# Patient Record
Sex: FEMALE | Race: WHITE | NOT HISPANIC OR LATINO | URBAN - METROPOLITAN AREA
[De-identification: names, ages, dates, MRNs, and addresses within clinical notes are randomized per-mention and may not be internally consistent; named-entity substitution may affect disease eponyms.]

---

## 2017-09-18 ENCOUNTER — OUTPATIENT (OUTPATIENT)
Dept: OUTPATIENT SERVICES | Facility: HOSPITAL | Age: 82
LOS: 1 days | Discharge: HOME | End: 2017-09-18

## 2017-09-18 DIAGNOSIS — Z12.31 ENCOUNTER FOR SCREENING MAMMOGRAM FOR MALIGNANT NEOPLASM OF BREAST: ICD-10-CM

## 2018-09-25 ENCOUNTER — OUTPATIENT (OUTPATIENT)
Dept: OUTPATIENT SERVICES | Facility: HOSPITAL | Age: 83
LOS: 1 days | Discharge: HOME | End: 2018-09-25

## 2018-09-25 DIAGNOSIS — Z12.31 ENCOUNTER FOR SCREENING MAMMOGRAM FOR MALIGNANT NEOPLASM OF BREAST: ICD-10-CM

## 2018-09-25 DIAGNOSIS — Z85.3 PERSONAL HISTORY OF MALIGNANT NEOPLASM OF BREAST: ICD-10-CM

## 2019-09-27 ENCOUNTER — OUTPATIENT (OUTPATIENT)
Dept: OUTPATIENT SERVICES | Facility: HOSPITAL | Age: 84
LOS: 1 days | Discharge: HOME | End: 2019-09-27
Payer: MEDICARE

## 2019-09-27 DIAGNOSIS — Z12.31 ENCOUNTER FOR SCREENING MAMMOGRAM FOR MALIGNANT NEOPLASM OF BREAST: ICD-10-CM

## 2019-09-27 PROCEDURE — 77067 SCR MAMMO BI INCL CAD: CPT | Mod: 26

## 2019-09-27 PROCEDURE — 77063 BREAST TOMOSYNTHESIS BI: CPT | Mod: 26

## 2020-08-11 ENCOUNTER — TRANSCRIPTION ENCOUNTER (OUTPATIENT)
Age: 85
End: 2020-08-11

## 2020-08-16 ENCOUNTER — TRANSCRIPTION ENCOUNTER (OUTPATIENT)
Age: 85
End: 2020-08-16

## 2020-09-30 ENCOUNTER — OUTPATIENT (OUTPATIENT)
Dept: OUTPATIENT SERVICES | Facility: HOSPITAL | Age: 85
LOS: 1 days | Discharge: HOME | End: 2020-09-30
Payer: MEDICARE

## 2020-09-30 DIAGNOSIS — Z12.31 ENCOUNTER FOR SCREENING MAMMOGRAM FOR MALIGNANT NEOPLASM OF BREAST: ICD-10-CM

## 2020-09-30 PROCEDURE — 77067 SCR MAMMO BI INCL CAD: CPT | Mod: 26

## 2020-09-30 PROCEDURE — 77063 BREAST TOMOSYNTHESIS BI: CPT | Mod: 26

## 2021-12-09 ENCOUNTER — OUTPATIENT (OUTPATIENT)
Dept: OUTPATIENT SERVICES | Facility: HOSPITAL | Age: 86
LOS: 1 days | Discharge: HOME | End: 2021-12-09
Payer: MEDICARE

## 2021-12-09 DIAGNOSIS — Z12.31 ENCOUNTER FOR SCREENING MAMMOGRAM FOR MALIGNANT NEOPLASM OF BREAST: ICD-10-CM

## 2021-12-09 PROCEDURE — 77067 SCR MAMMO BI INCL CAD: CPT | Mod: 26

## 2021-12-09 PROCEDURE — 77063 BREAST TOMOSYNTHESIS BI: CPT | Mod: 26

## 2023-05-01 ENCOUNTER — APPOINTMENT (OUTPATIENT)
Dept: CARDIOLOGY | Facility: CLINIC | Age: 88
End: 2023-05-01
Payer: MEDICARE

## 2023-05-01 VITALS — BODY MASS INDEX: 22.72 KG/M2 | HEIGHT: 65 IN | WEIGHT: 136.38 LBS

## 2023-05-01 DIAGNOSIS — C50.919 MALIGNANT NEOPLASM OF UNSPECIFIED SITE OF UNSPECIFIED FEMALE BREAST: ICD-10-CM

## 2023-05-01 PROCEDURE — 99214 OFFICE O/P EST MOD 30 MIN: CPT

## 2023-05-01 PROCEDURE — 99204 OFFICE O/P NEW MOD 45 MIN: CPT

## 2023-05-01 RX ORDER — LEVOTHYROXINE SODIUM 50 UG/1
50 TABLET ORAL
Qty: 90 | Refills: 0 | Status: ACTIVE | COMMUNITY
Start: 2023-03-24

## 2023-05-01 NOTE — PHYSICAL EXAM
[Normal Venous Pressure] : normal venous pressure [Normal S1, S2] : normal S1, S2 [Murmur] : murmur [Clear Lung Fields] : clear lung fields [Soft] : abdomen soft [Moves all extremities] : moves all extremities [de-identified] : benjie  [de-identified] : rrr

## 2023-05-01 NOTE — PHYSICAL EXAM
[Normal Venous Pressure] : normal venous pressure [Normal S1, S2] : normal S1, S2 [Murmur] : murmur [Clear Lung Fields] : clear lung fields [Soft] : abdomen soft [Moves all extremities] : moves all extremities [de-identified] : benjie  [de-identified] : rrr

## 2023-05-01 NOTE — HISTORY OF PRESENT ILLNESS
[FreeTextEntry1] : Pt with HTN, HLD, HYPOTHYROID, MILD AS, MIL AR PREDIABETES \par \par  predm: 5.8 \par echo in past: mild as, mild AR \par pt here with his son, pt has poor sleep at night unable to fall asleep at night. pt says unable to fall asleep. pt was prediabetic on last bloodwork.\par pt deneis cp or sob. pt does taichi and cleans all day. \par pt was on simvastatin with diltiazem and stopped it.

## 2023-06-15 ENCOUNTER — APPOINTMENT (OUTPATIENT)
Dept: ORTHOPEDIC SURGERY | Facility: CLINIC | Age: 88
End: 2023-06-15
Payer: MEDICARE

## 2023-06-15 DIAGNOSIS — Z78.9 OTHER SPECIFIED HEALTH STATUS: ICD-10-CM

## 2023-06-15 DIAGNOSIS — E03.9 HYPOTHYROIDISM, UNSPECIFIED: ICD-10-CM

## 2023-06-15 DIAGNOSIS — I10 ESSENTIAL (PRIMARY) HYPERTENSION: ICD-10-CM

## 2023-06-15 DIAGNOSIS — Z63.4 DISAPPEARANCE AND DEATH OF FAMILY MEMBER: ICD-10-CM

## 2023-06-15 DIAGNOSIS — Z85.3 PERSONAL HISTORY OF MALIGNANT NEOPLASM OF BREAST: ICD-10-CM

## 2023-06-15 PROCEDURE — 20610 DRAIN/INJ JOINT/BURSA W/O US: CPT | Mod: LT

## 2023-06-15 PROCEDURE — 73562 X-RAY EXAM OF KNEE 3: CPT | Mod: LT

## 2023-06-15 PROCEDURE — 99203 OFFICE O/P NEW LOW 30 MIN: CPT | Mod: 25

## 2023-06-15 RX ORDER — SERTRALINE 25 MG/1
TABLET, FILM COATED ORAL
Refills: 0 | Status: ACTIVE | COMMUNITY

## 2023-06-15 SDOH — SOCIAL STABILITY - SOCIAL INSECURITY: DISSAPEARANCE AND DEATH OF FAMILY MEMBER: Z63.4

## 2023-06-15 NOTE — PHYSICAL EXAM
[Left] : left knee [NL (0)] : extension 0 degrees [5___] : hamstring 5[unfilled]/5 [Negative] : negative posterior draw [de-identified] : General appearance the patient is unremarkable, well developed, well nourished, well groomed with no deformities.  Patient is alert and oriented.  Patient is able to communicate clearly.  Visible skin on the head, face, right upper extremity, left upper extremity and bilateral lower extremities are normal showing no rashes, lesions or ulcers.  Normal peripheral vascular system.  Normal coordination, mood and affect.  [] : negative Apley's [TWNoteComboBox7] : flexion 120 degrees

## 2023-06-15 NOTE — ASSESSMENT
[FreeTextEntry1] : Reviewed diagnosis with patient.  Discussed with her that osteoarthritis is a progressive disease.  Reviewed joint sparing activities.  Discussed with her that typically symptoms will wax and wane in severity.  She will continue with meloxicam 15 mg daily that was previously prescribed.  Refill given to her today.  Risk, benefits and alternatives to meloxicam discussed.  I did make her aware that all NSAIDs carry the risk of increased blood pressure and she should monitor her blood pressure closely while on this medication.  She was offered and verbally consented to a cortisone injection into the knee.  She tolerated the procedure well.  Authorization for gel injections was sent.  She will follow-up pending authorization of gel injections for administration of gel.  She expressed understanding and had no additional questions or concerns.

## 2023-06-15 NOTE — PROCEDURE
[Large Joint Injection] : Large joint injection [Left] : of the left [Knee] : knee [___ cc    1%] : Lidocaine ~Vcc of 1%  [___ cc    4mg] : Dexamethasone (Decadron) ~Vcc of 4 mg  [] : Patient tolerated procedure well [Call if redness, pain or fever occur] : call if redness, pain or fever occur [Apply ice for 15min out of every hour for the next 12-24 hours as tolerated] : apply ice for 15 minutes out of every hour for the next 12-24 hours as tolerated [Previous OTC use and PT nontherapeutic] : patient has tried OTC's including aspirin, Ibuprofen, Aleve, etc or prescription NSAIDS, and/or exercises at home and/or physical therapy without satisfactory response [Patient had decreased mobility in the joint] : patient had decreased mobility in the joint [Risks, benefits, alternatives discussed / Verbal consent obtained] : the risks benefits, and alternatives have been discussed, and verbal consent was obtained

## 2023-06-15 NOTE — DATA REVIEWED
[Left] : left [Knee] : knee [I independently reviewed and interpreted images and report] : I independently reviewed and interpreted images and report [FreeTextEntry1] : Medial joint space narrowing as well as multiple osteophytes noted off of the patella consistent with tricompartmental osteoarthritis of the knee

## 2023-06-15 NOTE — HISTORY OF PRESENT ILLNESS
[de-identified] : Patient is an 88-year-old female coming in today for evaluation of left knee pain.  She states that she woke up 2 weeks ago with no history of trauma.  She developed some swelling as well as medial knee pain.  She started walking differently and then developed some anterior left lower leg/shin pain.  She has tried Mobic, Aleve and Tylenol with minimal relief.  She has tried multiple topical medications with minimal relief.  She states the only thing that seems to help is rest and ice coming in for initial orthopedic evaluation.

## 2023-06-21 ENCOUNTER — APPOINTMENT (OUTPATIENT)
Dept: ORTHOPEDIC SURGERY | Facility: CLINIC | Age: 88
End: 2023-06-21
Payer: MEDICARE

## 2023-06-21 PROCEDURE — 20610 DRAIN/INJ JOINT/BURSA W/O US: CPT | Mod: LT

## 2023-06-21 PROCEDURE — 99213 OFFICE O/P EST LOW 20 MIN: CPT | Mod: 25

## 2023-06-21 NOTE — HISTORY OF PRESENT ILLNESS
[de-identified] : Patient is an 88-year-old female coming in today for evaluation of left knee pain.  She states that she woke up 2 weeks ago with no history of trauma.  She developed some swelling as well as medial knee pain.  She started walking differently and then developed some anterior left lower leg/shin pain.  She has tried Mobic, Aleve and Tylenol with minimal relief.  She has tried multiple topical medications with minimal relief.  She states the only thing that seems to help is rest and ice coming in for initial orthopedic \par evaluation.\par \par 6/21/23: patient coming in today for Synvisc Injection 1 of 3 of the Left Knee

## 2023-06-21 NOTE — PHYSICAL EXAM
[de-identified] : General appearance the patient is unremarkable, well developed, well nourished, well groomed with no deformities.  Patient is alert and oriented.  Patient is able to communicate clearly.  Visible skin on the head, face, right upper extremity, left upper extremity and bilateral lower extremities are normal showing no rashes, lesions or ulcers.  Normal peripheral vascular system.  Normal coordination, mood and affect.  [Left] : left knee [NL (0)] : extension 0 degrees [5___] : hamstring 5[unfilled]/5 [Negative] : negative posterior draw [] : ambulation with cane [TWNoteComboBox7] : flexion 120 degrees

## 2023-06-21 NOTE — ASSESSMENT
[FreeTextEntry1] : Here today for Synvisc Injection 1 of 3. \par Tolerated Procedure well\par Follow up in 1 week for 2nd Injection. \par She expressed understanding and had no additional questions or concerns.

## 2023-06-21 NOTE — PROCEDURE
[Large Joint Injection] : Large joint injection [Left] : of the left [Knee] : knee [___ cc    1%] : Lidocaine ~Vcc of 1%  [Synvisc (16mg)] : 16mg of Synvisc [#1] : series #1 [] : Patient tolerated procedure well [Call if redness, pain or fever occur] : call if redness, pain or fever occur [Apply ice for 15min out of every hour for the next 12-24 hours as tolerated] : apply ice for 15 minutes out of every hour for the next 12-24 hours as tolerated [Previous OTC use and PT nontherapeutic] : patient has tried OTC's including aspirin, Ibuprofen, Aleve, etc or prescription NSAIDS, and/or exercises at home and/or physical therapy without satisfactory response [Patient had decreased mobility in the joint] : patient had decreased mobility in the joint [Risks, benefits, alternatives discussed / Verbal consent obtained] : the risks benefits, and alternatives have been discussed, and verbal consent was obtained

## 2023-06-28 ENCOUNTER — APPOINTMENT (OUTPATIENT)
Dept: ORTHOPEDIC SURGERY | Facility: CLINIC | Age: 88
End: 2023-06-28
Payer: MEDICARE

## 2023-06-28 PROCEDURE — 99213 OFFICE O/P EST LOW 20 MIN: CPT | Mod: 25

## 2023-06-28 PROCEDURE — 20610 DRAIN/INJ JOINT/BURSA W/O US: CPT | Mod: LT

## 2023-06-28 NOTE — ASSESSMENT
[FreeTextEntry1] : Here today for Synvisc Injection 2 of 3. \par Tolerated Procedure well\par Follow up in 1 week for 3rd Injection. \par She expressed understanding and had no additional questions or concerns.

## 2023-06-28 NOTE — HISTORY OF PRESENT ILLNESS
[de-identified] : Patient is an 88-year-old female coming in today for evaluation of left knee pain.  She states that she woke up 2 weeks ago with no history of trauma.  She developed some swelling as well as medial knee pain.  She started walking differently and then developed some anterior left lower leg/shin pain.  She has tried Mobic, Aleve and Tylenol with minimal relief.  She has tried multiple topical medications with minimal relief.  She states the only thing that seems to help is rest and ice coming in for initial orthopedic \par evaluation.\par \par 6/28/23: patient coming in today for Synvisc Injection 2 of 3 of the Left Knee

## 2023-06-28 NOTE — PROCEDURE
[Large Joint Injection] : Large joint injection [Left] : of the left [Knee] : knee [___ cc    1%] : Lidocaine ~Vcc of 1%  [Synvisc (16mg)] : 16mg of Synvisc [#2] : series #2 [] : Patient tolerated procedure well [Call if redness, pain or fever occur] : call if redness, pain or fever occur [Apply ice for 15min out of every hour for the next 12-24 hours as tolerated] : apply ice for 15 minutes out of every hour for the next 12-24 hours as tolerated [Previous OTC use and PT nontherapeutic] : patient has tried OTC's including aspirin, Ibuprofen, Aleve, etc or prescription NSAIDS, and/or exercises at home and/or physical therapy without satisfactory response [Patient had decreased mobility in the joint] : patient had decreased mobility in the joint [Risks, benefits, alternatives discussed / Verbal consent obtained] : the risks benefits, and alternatives have been discussed, and verbal consent was obtained

## 2023-07-06 ENCOUNTER — APPOINTMENT (OUTPATIENT)
Dept: ORTHOPEDIC SURGERY | Facility: CLINIC | Age: 88
End: 2023-07-06
Payer: MEDICARE

## 2023-07-06 PROCEDURE — 99213 OFFICE O/P EST LOW 20 MIN: CPT | Mod: 25

## 2023-07-06 PROCEDURE — 20610 DRAIN/INJ JOINT/BURSA W/O US: CPT | Mod: LT

## 2023-07-06 NOTE — DISCUSSION/SUMMARY
[de-identified] : Today, patient is a good third Synvisc 3 injection using sterile technique.  Patient tolerated this procedure well.  She was encouraged to rest the knee and ice for the next few days.  Patient will follow-up in 1 month for further evaluation if needed.  Patient agrees with plan all questions were answered today

## 2023-07-06 NOTE — PHYSICAL EXAM
[de-identified] : Patient has mild swelling of the left knee.  Patient able to fully flex and extend the knee with no pain.  Calf is soft and nontender.

## 2023-07-06 NOTE — HISTORY OF PRESENT ILLNESS
[de-identified] : 88-year-old female is here for her third Synvisc injection, to finish the series.  Patient tolerated the last injections well with no complaints.  Patient states her knee is feeling better and ready.

## 2023-07-10 ENCOUNTER — RX RENEWAL (OUTPATIENT)
Age: 88
End: 2023-07-10

## 2023-08-07 ENCOUNTER — APPOINTMENT (OUTPATIENT)
Dept: CARDIOLOGY | Facility: CLINIC | Age: 88
End: 2023-08-07

## 2023-08-10 ENCOUNTER — APPOINTMENT (OUTPATIENT)
Dept: ORTHOPEDIC SURGERY | Facility: CLINIC | Age: 88
End: 2023-08-10

## 2023-08-10 ENCOUNTER — APPOINTMENT (OUTPATIENT)
Dept: ORTHOPEDIC SURGERY | Facility: CLINIC | Age: 88
End: 2023-08-10
Payer: MEDICARE

## 2023-08-10 PROCEDURE — 99213 OFFICE O/P EST LOW 20 MIN: CPT

## 2023-08-10 NOTE — HISTORY OF PRESENT ILLNESS
[de-identified] : 88-year-old female comes in today for repeat evaluation subsequent encounter of her left knee.  Patient had a cortisone injection and a Synvisc series in her left knee.  She is 4 weeks from the Synvisc injection.  She still experiencing some discomfort and mild swelling in the knee.  She is accompanied by her son today.  Ambulating with a cane.

## 2023-08-10 NOTE — ASSESSMENT
[FreeTextEntry1] : At this time conservative treatment.  She has meloxicam, although has not taken it consistently.  She is going to take meloxicam.  We did discuss giving the Synvisc injection a little more time to be effective.  Follow-up in a few months for repeat evaluation.

## 2023-08-10 NOTE — IMAGING
[de-identified] : On examination of the left knee mild swelling, no ecchymosis, no erythema, no warmth.  Skin is intact.  She is able to actively flex and extend the knee.  Slight discomfort along the joint line.  Calf is soft nontender.  She is ambulating with a cane.

## 2023-09-05 ENCOUNTER — APPOINTMENT (OUTPATIENT)
Dept: CARDIOLOGY | Facility: CLINIC | Age: 88
End: 2023-09-05
Payer: MEDICARE

## 2023-09-05 VITALS — HEART RATE: 74 BPM | DIASTOLIC BLOOD PRESSURE: 70 MMHG | SYSTOLIC BLOOD PRESSURE: 130 MMHG

## 2023-09-05 VITALS — BODY MASS INDEX: 22.55 KG/M2 | WEIGHT: 135.31 LBS | HEIGHT: 65 IN

## 2023-09-05 DIAGNOSIS — Z00.00 ENCOUNTER FOR GENERAL ADULT MEDICAL EXAMINATION W/OUT ABNORMAL FINDINGS: ICD-10-CM

## 2023-09-05 PROCEDURE — 99214 OFFICE O/P EST MOD 30 MIN: CPT | Mod: 25

## 2023-09-05 PROCEDURE — 93000 ELECTROCARDIOGRAM COMPLETE: CPT

## 2023-09-05 RX ORDER — DILTIAZEM HYDROCHLORIDE 180 MG/1
180 CAPSULE, EXTENDED RELEASE ORAL DAILY
Qty: 90 | Refills: 0 | Status: DISCONTINUED | COMMUNITY
Start: 2023-04-21 | End: 2023-09-05

## 2023-09-05 NOTE — HISTORY OF PRESENT ILLNESS
[FreeTextEntry1] : Pt with HTN, HLD, HYPOTHYROID, MILD AS, MILD AR PREDIABETES    predm: 5.8  echo in past: mild as, mild AR  pt here with his son, pt has poor sleep at night unable to fall asleep at night. pt says unable to fall asleep. pt was prediabetic on last bloodwork. pt denies cp or sob. pt does Taichi and cleans all day as her forms of exercise.  pt was on simvastatin with diltiazem and stopped it.   9/5/23:  Patient states she has OA in left knee, pt was [facility] for echo. pt has gel injections and not helping, pt is not very active and taking tylenol arthritis.  pt does TAICHI at home. pt has large house.  pt tolerating atorvastatin with no issues.  pt had bloodwork with thyroid doctor.

## 2023-09-05 NOTE — DISCUSSION/SUMMARY
[FreeTextEntry1] : get 2  decho  get bloodwork  continue atorvastatin  continue diltiazem  [EKG obtained to assist in diagnosis and management of assessed problem(s)] : EKG obtained to assist in diagnosis and management of assessed problem(s)

## 2023-09-05 NOTE — PHYSICAL EXAM
[Normal Venous Pressure] : normal venous pressure [Normal S1, S2] : normal S1, S2 [Murmur] : murmur [Clear Lung Fields] : clear lung fields [Soft] : abdomen soft [Moves all extremities] : moves all extremities [de-identified] : benjie  [de-identified] : rrr

## 2023-09-28 ENCOUNTER — RX RENEWAL (OUTPATIENT)
Age: 88
End: 2023-09-28

## 2023-10-02 ENCOUNTER — APPOINTMENT (OUTPATIENT)
Dept: CARDIOLOGY | Facility: CLINIC | Age: 88
End: 2023-10-02

## 2023-10-17 ENCOUNTER — APPOINTMENT (OUTPATIENT)
Dept: CARDIOLOGY | Facility: CLINIC | Age: 88
End: 2023-10-17
Payer: MEDICARE

## 2023-10-17 PROCEDURE — 93306 TTE W/DOPPLER COMPLETE: CPT

## 2023-10-24 ENCOUNTER — APPOINTMENT (OUTPATIENT)
Dept: ORTHOPEDIC SURGERY | Facility: CLINIC | Age: 88
End: 2023-10-24
Payer: MEDICARE

## 2023-10-24 DIAGNOSIS — M17.12 UNILATERAL PRIMARY OSTEOARTHRITIS, LEFT KNEE: ICD-10-CM

## 2023-10-24 PROCEDURE — 99213 OFFICE O/P EST LOW 20 MIN: CPT

## 2023-10-24 RX ORDER — DICLOFENAC SODIUM 75 MG/1
75 TABLET, DELAYED RELEASE ORAL
Qty: 60 | Refills: 2 | Status: ACTIVE | COMMUNITY
Start: 2023-10-24 | End: 1900-01-01

## 2023-11-07 ENCOUNTER — APPOINTMENT (OUTPATIENT)
Dept: CARDIOLOGY | Facility: CLINIC | Age: 88
End: 2023-11-07
Payer: MEDICARE

## 2023-11-07 VITALS — HEIGHT: 65 IN | WEIGHT: 135 LBS | BODY MASS INDEX: 22.49 KG/M2

## 2023-11-07 PROCEDURE — 99214 OFFICE O/P EST MOD 30 MIN: CPT

## 2023-11-07 RX ORDER — MELOXICAM 15 MG/1
15 TABLET ORAL DAILY
Qty: 30 | Refills: 1 | Status: DISCONTINUED | COMMUNITY
Start: 2023-06-15 | End: 2023-11-07

## 2023-11-07 RX ORDER — ATORVASTATIN CALCIUM 20 MG/1
20 TABLET, FILM COATED ORAL
Qty: 90 | Refills: 3 | Status: ACTIVE | COMMUNITY
Start: 1900-01-01 | End: 1900-01-01

## 2023-12-19 ENCOUNTER — APPOINTMENT (OUTPATIENT)
Dept: ORTHOPEDIC SURGERY | Facility: CLINIC | Age: 88
End: 2023-12-19

## 2023-12-22 ENCOUNTER — RX RENEWAL (OUTPATIENT)
Age: 88
End: 2023-12-22

## 2023-12-22 RX ORDER — DILTIAZEM HYDROCHLORIDE 180 MG/1
180 CAPSULE, EXTENDED RELEASE ORAL
Qty: 90 | Refills: 3 | Status: ACTIVE | COMMUNITY
Start: 2023-07-10 | End: 1900-01-01

## 2024-02-05 ENCOUNTER — APPOINTMENT (OUTPATIENT)
Dept: CARDIOLOGY | Facility: CLINIC | Age: 89
End: 2024-02-05
Payer: MEDICARE

## 2024-02-05 PROCEDURE — 93880 EXTRACRANIAL BILAT STUDY: CPT

## 2024-02-22 ENCOUNTER — APPOINTMENT (OUTPATIENT)
Dept: CARDIOLOGY | Facility: CLINIC | Age: 89
End: 2024-02-22
Payer: MEDICARE

## 2024-02-22 VITALS — SYSTOLIC BLOOD PRESSURE: 110 MMHG | HEART RATE: 75 BPM | DIASTOLIC BLOOD PRESSURE: 70 MMHG

## 2024-02-22 VITALS — HEIGHT: 65 IN | BODY MASS INDEX: 22.66 KG/M2 | WEIGHT: 136 LBS

## 2024-02-22 DIAGNOSIS — I35.0 NONRHEUMATIC AORTIC (VALVE) STENOSIS: ICD-10-CM

## 2024-02-22 DIAGNOSIS — E78.2 MIXED HYPERLIPIDEMIA: ICD-10-CM

## 2024-02-22 DIAGNOSIS — E03.9 HYPOTHYROIDISM, UNSPECIFIED: ICD-10-CM

## 2024-02-22 DIAGNOSIS — I10 ESSENTIAL (PRIMARY) HYPERTENSION: ICD-10-CM

## 2024-02-22 DIAGNOSIS — R73.03 PREDIABETES.: ICD-10-CM

## 2024-02-22 DIAGNOSIS — I65.23 OCCLUSION AND STENOSIS OF BILATERAL CAROTID ARTERIES: ICD-10-CM

## 2024-02-22 DIAGNOSIS — I77.810 THORACIC AORTIC ECTASIA: ICD-10-CM

## 2024-02-22 PROCEDURE — 99214 OFFICE O/P EST MOD 30 MIN: CPT

## 2024-03-01 NOTE — DISCUSSION/SUMMARY
[FreeTextEntry1] : continue atorvastatin to 20 mg po qhs  continue diltiazem  f/u thyroid with pmd  DD2 - fairly well compensated  and diuresis as necessary.  get blood work call and send new bw script

## 2024-03-01 NOTE — PHYSICAL EXAM
[Normal Venous Pressure] : normal venous pressure [Normal S1, S2] : normal S1, S2 [Murmur] : murmur [Clear Lung Fields] : clear lung fields [Soft] : abdomen soft [Moves all extremities] : moves all extremities [de-identified] : rrr [de-identified] : benjie

## 2024-03-01 NOTE — HISTORY OF PRESENT ILLNESS
[FreeTextEntry1] : Pt with HTN, HLD, HYPOTHYROID, MILD AS, MILD AR PREDIABETES, DD2, CAROTID ATHEROSCLEROSIS MILD    predm: 5.8  pt here with his son, pt has poor sleep at night unable to fall asleep at night. pt was prediabetic on last bloodwork. pt does Taichi and cleans all day as her forms of exercise.  pt was on simvastatin with diltiazem and stopped simvastatin   23:  Patient states she has OA in left knee, pt was [facility] for echo. pt has gel injections and not helping, pt is not very active and taking tylenol for arthritis.  pt does TAICHI at home. pt has large house.  pt tolerating atorvastatin with no issues.  pt had bloodwork with thyroid doctor.   23: 10/17/23: EF: 55%, E' sept: 0.04, E/e': 14, LVOT: 20.2, DD1, borderline LAE, mild MR, aorta: 4, PASP: 28, mild AR, NCC severely restricted AV aspen: 1.7 cm  10/23:  HDL 65  GFR; 67   A1C: 5.5 and no longer diabetic.  pt sob with fast exertion, severe OA using a cane. pt no driving anymore. pt for TAICHI  24:  24: Carotid: less than 50% b/l ica. Mild intimal atherosclerosis LICA. LIZABETH tortuous. had atorvastatin increased to 20 mg and tolerating the medication.  pt has not had bloodwork for september.  pt denies cp, pt cleans and does ADLs at home. pt taichi daily.   24: LDL: 117, T, HDL: 60, A1C: 5.7, BNP: 71

## 2024-08-29 ENCOUNTER — APPOINTMENT (OUTPATIENT)
Dept: CARDIOLOGY | Facility: CLINIC | Age: 89
End: 2024-08-29
Payer: MEDICARE

## 2024-08-29 VITALS — HEIGHT: 65 IN | WEIGHT: 133 LBS | BODY MASS INDEX: 22.16 KG/M2

## 2024-08-29 DIAGNOSIS — I35.0 NONRHEUMATIC AORTIC (VALVE) STENOSIS: ICD-10-CM

## 2024-08-29 DIAGNOSIS — E78.2 MIXED HYPERLIPIDEMIA: ICD-10-CM

## 2024-08-29 DIAGNOSIS — I77.810 THORACIC AORTIC ECTASIA: ICD-10-CM

## 2024-08-29 DIAGNOSIS — I65.23 OCCLUSION AND STENOSIS OF BILATERAL CAROTID ARTERIES: ICD-10-CM

## 2024-08-29 DIAGNOSIS — I10 ESSENTIAL (PRIMARY) HYPERTENSION: ICD-10-CM

## 2024-08-29 DIAGNOSIS — R73.03 PREDIABETES.: ICD-10-CM

## 2024-08-29 PROCEDURE — G2211 COMPLEX E/M VISIT ADD ON: CPT

## 2024-08-29 PROCEDURE — 99214 OFFICE O/P EST MOD 30 MIN: CPT

## 2024-08-29 NOTE — DISCUSSION/SUMMARY
[FreeTextEntry1] : continue atorvastatin to 20 mg po qhs  continue diltiazem  f/u thyroid with pmd  DD2 - fairly well compensated and diuresis as necessary. will send furosemide as needed.  get blood work 2 d echo 4 months

## 2024-08-29 NOTE — PHYSICAL EXAM
[Normal Venous Pressure] : normal venous pressure [Normal S1, S2] : normal S1, S2 [Murmur] : murmur [Clear Lung Fields] : clear lung fields [Soft] : abdomen soft [Moves all extremities] : moves all extremities [de-identified] : benjie  [de-identified] : rrr

## 2024-08-29 NOTE — PHYSICAL EXAM
[Normal Venous Pressure] : normal venous pressure [Normal S1, S2] : normal S1, S2 [Murmur] : murmur [Clear Lung Fields] : clear lung fields [Soft] : abdomen soft [Moves all extremities] : moves all extremities [de-identified] : benjie  [de-identified] : rrr

## 2024-08-29 NOTE — HISTORY OF PRESENT ILLNESS
[FreeTextEntry1] : Pt with HTN, HLD, HYPOTHYROID, MILD AS, MILD AR PREDIABETES, DD2, CAROTID ATHEROSCLEROSIS MILD   EKG: nsr with lvh, lafb with nsst changes,poor r progression.   predm: 5.8  pt here with his son, pt has poor sleep at night unable to fall asleep at night. pt was prediabetic on last bloodwork. pt does Taichi and cleans all day as her forms of exercise.  pt was on simvastatin with diltiazem and stopped simvastatin   23:  Patient states she has OA in left knee, pt was [facility] for echo. pt has gel injections and not helping, pt is not very active and taking tylenol for arthritis.  pt does TAICHI at home. pt has large house.  pt tolerating atorvastatin with no issues.  pt had bloodwork with thyroid doctor.   23: 10/17/23: EF: 55%, E' sept: 0.04, E/e': 14, LVOT: 20.2, DD1, borderline LAE, mild MR, aorta: 4, PASP: 28, mild AR, NCC severely restricted AV aspen: 1.7 cm  10/23:  HDL 65  GFR; 67   A1C: 5.5 and no longer diabetic.  pt sob with fast exertion, severe OA using a cane. pt no driving anymore. pt for TAICHI  24:  24: Carotid: less than 50% b/l ica. Mild intimal atherosclerosis LICA. LIZABETH tortuous. had atorvastatin increased to 20 mg and tolerating the medication.  pt has not had bloodwork for september.  pt denies cp, pt cleans and does ADLs at home. pt taichi daily.   24: LDL: 117, T, HDL: 60, A1C: 5.7, BNP: 71  24:  24: HDL: 60, T, LDL: 121 increased, A1C: 6.1 increased, BNP: 84 pt eating poorly and A1c increasing. pt has sciatica and walking around doing her own cleaning. pt denies cp, pt get s sob after doing vacuuming her house. pt does taichi at home.

## 2024-12-12 ENCOUNTER — APPOINTMENT (OUTPATIENT)
Dept: CARDIOLOGY | Facility: CLINIC | Age: 89
End: 2024-12-12
Payer: MEDICARE

## 2024-12-12 PROCEDURE — 93306 TTE W/DOPPLER COMPLETE: CPT

## 2024-12-17 ENCOUNTER — RX RENEWAL (OUTPATIENT)
Age: 89
End: 2024-12-17

## 2024-12-19 ENCOUNTER — APPOINTMENT (OUTPATIENT)
Dept: CARDIOLOGY | Facility: CLINIC | Age: 89
End: 2024-12-19
Payer: MEDICARE

## 2024-12-19 VITALS
OXYGEN SATURATION: 93 % | SYSTOLIC BLOOD PRESSURE: 130 MMHG | HEIGHT: 65 IN | RESPIRATION RATE: 14 BRPM | WEIGHT: 130 LBS | BODY MASS INDEX: 21.66 KG/M2 | DIASTOLIC BLOOD PRESSURE: 70 MMHG | HEART RATE: 97 BPM

## 2024-12-19 DIAGNOSIS — I35.0 NONRHEUMATIC AORTIC (VALVE) STENOSIS: ICD-10-CM

## 2024-12-19 DIAGNOSIS — I65.23 OCCLUSION AND STENOSIS OF BILATERAL CAROTID ARTERIES: ICD-10-CM

## 2024-12-19 DIAGNOSIS — R73.03 PREDIABETES.: ICD-10-CM

## 2024-12-19 DIAGNOSIS — E78.2 MIXED HYPERLIPIDEMIA: ICD-10-CM

## 2024-12-19 DIAGNOSIS — E03.9 HYPOTHYROIDISM, UNSPECIFIED: ICD-10-CM

## 2024-12-19 DIAGNOSIS — I10 ESSENTIAL (PRIMARY) HYPERTENSION: ICD-10-CM

## 2024-12-19 DIAGNOSIS — I77.810 THORACIC AORTIC ECTASIA: ICD-10-CM

## 2024-12-19 PROCEDURE — G2211 COMPLEX E/M VISIT ADD ON: CPT

## 2024-12-19 PROCEDURE — 99214 OFFICE O/P EST MOD 30 MIN: CPT

## 2024-12-19 RX ORDER — VALSARTAN 160 MG/1
160 TABLET, COATED ORAL DAILY
Qty: 90 | Refills: 3 | Status: ACTIVE | COMMUNITY
Start: 2024-12-19 | End: 1900-01-01

## 2025-04-08 ENCOUNTER — APPOINTMENT (OUTPATIENT)
Dept: CARDIOLOGY | Facility: CLINIC | Age: 89
End: 2025-04-08
Payer: MEDICARE

## 2025-04-08 PROCEDURE — 93306 TTE W/DOPPLER COMPLETE: CPT

## 2025-04-23 ENCOUNTER — NON-APPOINTMENT (OUTPATIENT)
Age: 89
End: 2025-04-23

## 2025-04-23 ENCOUNTER — APPOINTMENT (OUTPATIENT)
Dept: CARDIOLOGY | Facility: CLINIC | Age: 89
End: 2025-04-23
Payer: MEDICARE

## 2025-04-23 VITALS
SYSTOLIC BLOOD PRESSURE: 110 MMHG | HEIGHT: 65 IN | BODY MASS INDEX: 21.16 KG/M2 | WEIGHT: 127 LBS | RESPIRATION RATE: 14 BRPM | HEART RATE: 93 BPM | DIASTOLIC BLOOD PRESSURE: 70 MMHG | OXYGEN SATURATION: 93 %

## 2025-04-23 DIAGNOSIS — E78.2 MIXED HYPERLIPIDEMIA: ICD-10-CM

## 2025-04-23 DIAGNOSIS — R73.03 PREDIABETES.: ICD-10-CM

## 2025-04-23 DIAGNOSIS — I77.810 THORACIC AORTIC ECTASIA: ICD-10-CM

## 2025-04-23 DIAGNOSIS — I35.0 NONRHEUMATIC AORTIC (VALVE) STENOSIS: ICD-10-CM

## 2025-04-23 DIAGNOSIS — I65.23 OCCLUSION AND STENOSIS OF BILATERAL CAROTID ARTERIES: ICD-10-CM

## 2025-04-23 DIAGNOSIS — I10 ESSENTIAL (PRIMARY) HYPERTENSION: ICD-10-CM

## 2025-04-23 PROCEDURE — 99214 OFFICE O/P EST MOD 30 MIN: CPT

## 2025-04-23 PROCEDURE — G2211 COMPLEX E/M VISIT ADD ON: CPT

## 2025-08-06 ENCOUNTER — APPOINTMENT (OUTPATIENT)
Dept: CARDIOLOGY | Facility: CLINIC | Age: 89
End: 2025-08-06

## 2025-08-19 ENCOUNTER — APPOINTMENT (OUTPATIENT)
Dept: CARDIOLOGY | Facility: CLINIC | Age: 89
End: 2025-08-19

## 2025-08-19 DIAGNOSIS — I65.23 OCCLUSION AND STENOSIS OF BILATERAL CAROTID ARTERIES: ICD-10-CM

## 2025-08-19 DIAGNOSIS — I10 ESSENTIAL (PRIMARY) HYPERTENSION: ICD-10-CM

## 2025-08-19 DIAGNOSIS — I77.810 THORACIC AORTIC ECTASIA: ICD-10-CM

## 2025-08-19 DIAGNOSIS — R73.03 PREDIABETES.: ICD-10-CM

## 2025-08-19 DIAGNOSIS — I35.0 NONRHEUMATIC AORTIC (VALVE) STENOSIS: ICD-10-CM

## 2025-08-19 DIAGNOSIS — E78.2 MIXED HYPERLIPIDEMIA: ICD-10-CM
